# Patient Record
Sex: FEMALE | Race: WHITE | NOT HISPANIC OR LATINO | Employment: FULL TIME | URBAN - METROPOLITAN AREA
[De-identification: names, ages, dates, MRNs, and addresses within clinical notes are randomized per-mention and may not be internally consistent; named-entity substitution may affect disease eponyms.]

---

## 2021-03-24 ENCOUNTER — OFFICE VISIT (OUTPATIENT)
Dept: FAMILY MEDICINE CLINIC | Facility: CLINIC | Age: 26
End: 2021-03-24

## 2021-03-24 VITALS
TEMPERATURE: 97.8 F | RESPIRATION RATE: 16 BRPM | DIASTOLIC BLOOD PRESSURE: 54 MMHG | OXYGEN SATURATION: 99 % | HEART RATE: 92 BPM | SYSTOLIC BLOOD PRESSURE: 100 MMHG | WEIGHT: 154.8 LBS

## 2021-03-24 DIAGNOSIS — Z23 NEED FOR PROPHYLACTIC VACCINATION AGAINST HUMAN PAPILLOMAVIRUS: ICD-10-CM

## 2021-03-24 DIAGNOSIS — Z00.00 ROUTINE HEALTH MAINTENANCE: Primary | ICD-10-CM

## 2021-03-24 PROCEDURE — 99385 PREV VISIT NEW AGE 18-39: CPT | Performed by: FAMILY MEDICINE

## 2021-03-24 PROCEDURE — 90471 IMMUNIZATION ADMIN: CPT | Performed by: FAMILY MEDICINE

## 2021-03-24 PROCEDURE — 90649 4VHPV VACCINE 3 DOSE IM: CPT | Performed by: FAMILY MEDICINE

## 2021-03-24 NOTE — PROGRESS NOTES
Chief Complaint  Establish Care    Subjective    History of Present Illness {CC  Problem List  Visit  Diagnosis   Encounters  Notes  Medications  Labs  Result Review Imaging  Media :23}     Eunice Padilla presents to [facility] for Establish Care.  History of Present Illness     Here today to establish care.  Doing well overall, considers herself quite healthy.  Just has not seen a primary care physician in some time.  No specific questions or concerns at this time.    Car seat balanced diet, exercises regularly.  Enjoys her work.  Has good family and social support.  She is not sexually active, nor has she ever been.  Has never had a Pap smear.  Not sure that she is ever had the HPV vaccine, willing to do so today.  She goes to the dentist regularly.  She wears her seatbelt.    Objective     Vital Signs:   /54   Pulse 92   Temp 97.8 °F (36.6 °C)   Resp 16   Wt 70.2 kg (154 lb 12.8 oz)   SpO2 99%   Physical Exam  Vitals and nursing note reviewed.   Constitutional:       General: She is not in acute distress.     Appearance: Normal appearance. She is well-developed. She is not ill-appearing.   HENT:      Right Ear: Tympanic membrane, ear canal and external ear normal.      Left Ear: Tympanic membrane, ear canal and external ear normal.      Nose: Nose normal.      Mouth/Throat:      Mouth: Mucous membranes are moist.      Pharynx: Oropharynx is clear.   Eyes:      Extraocular Movements: Extraocular movements intact.      Conjunctiva/sclera: Conjunctivae normal.      Pupils: Pupils are equal, round, and reactive to light.   Cardiovascular:      Rate and Rhythm: Normal rate and regular rhythm.      Pulses: Normal pulses.      Heart sounds: Normal heart sounds. No murmur heard.     Pulmonary:      Effort: Pulmonary effort is normal. No respiratory distress.      Breath sounds: Normal breath sounds. No wheezing.   Abdominal:      General: Abdomen is flat. Bowel sounds  are normal. There is no distension.      Palpations: Abdomen is soft.      Tenderness: There is no abdominal tenderness. There is no guarding or rebound.   Musculoskeletal:         General: No tenderness. Normal range of motion.      Cervical back: Normal range of motion and neck supple.   Skin:     General: Skin is warm and dry.   Neurological:      General: No focal deficit present.      Mental Status: She is alert and oriented to person, place, and time.      Sensory: No sensory deficit.   Psychiatric:         Mood and Affect: Mood normal.         Behavior: Behavior normal.          Result Review  Data Reviewed:{ Labs  Result Review  Imaging  Med Tab  Media :23}                   Assessment and Plan {CC Problem List  Visit Diagnosis  ROS  Review (Popup)  Health Maintenance  Quality  BestPractice  Medications  SmartSets  SnapShot Encounters  Media :23}   Diagnoses and all orders for this visit:    1. Routine health maintenance (Primary)    2. Need for prophylactic vaccination against human papillomavirus  -     HPV Vaccine QuadriValent 3 Dose IM    HPV vaccine as above.  We will follow-up for second dose as instructed.    Recommended follow-up as below.  Encouraged communication via e[employer] in the meantime.      Follow Up {Instructions Charge Capture  Follow-up Communications :23}     Patient was given instructions and counseling regarding her condition or for health maintenance advice. Please see specific information pulled into the AVS (placed there by myself) if appropriate.    Return in about 1 year (around 3/24/2022), or if symptoms worsen or fail to improve.      LULU Alexis MD    Prevention counseling performed as below: Mindfulness for stress management.

## 2021-07-14 ENCOUNTER — TELEPHONE (OUTPATIENT)
Dept: FAMILY MEDICINE CLINIC | Facility: CLINIC | Age: 26
End: 2021-07-14

## 2021-07-14 NOTE — TELEPHONE ENCOUNTER
Caller: Eunice Padilla    Relationship to patient: Self    Best call back number: [phone]    Chief complaint: ANKLE PAIN     Type of visit: OFFICE VISIT    Requested date: THIS WEEK    If rescheduling, when is the original appointment: 7/28/2021     Additional notes:

## 2021-08-24 ENCOUNTER — OFFICE VISIT (OUTPATIENT)
Dept: FAMILY MEDICINE CLINIC | Facility: CLINIC | Age: 26
End: 2021-08-24

## 2021-08-24 VITALS
WEIGHT: 155 LBS | HEART RATE: 87 BPM | DIASTOLIC BLOOD PRESSURE: 56 MMHG | SYSTOLIC BLOOD PRESSURE: 100 MMHG | OXYGEN SATURATION: 98 % | RESPIRATION RATE: 16 BRPM | TEMPERATURE: 97.6 F

## 2021-08-24 DIAGNOSIS — F41.1 GENERALIZED ANXIETY DISORDER: Primary | ICD-10-CM

## 2021-08-24 DIAGNOSIS — Z23 NEED FOR VACCINATION: ICD-10-CM

## 2021-08-24 PROCEDURE — 99213 OFFICE O/P EST LOW 20 MIN: CPT | Performed by: FAMILY MEDICINE

## 2021-08-24 PROCEDURE — 90651 9VHPV VACCINE 2/3 DOSE IM: CPT | Performed by: FAMILY MEDICINE

## 2021-08-24 PROCEDURE — 90471 IMMUNIZATION ADMIN: CPT | Performed by: FAMILY MEDICINE

## 2021-08-24 NOTE — PROGRESS NOTES
Chief Complaint  Anxiety    Subjective    History of Present Illness {CC  Problem List  Visit  Diagnosis   Encounters  Notes  Medications  Labs  Result Review Imaging  Media :23}     Eunice Padilla presents to [facility] for Anxiety.  History of Present Illness     Pt presents today with anxiety that has been increasing in severity for the last week.  She has a family history of anxiety and had taking 50mg sertraline during high school and college and felt that this adequately managed her symptoms. 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     She has recently started preparing to apply to medical school and due to the increased anxiety, she wants to restart her Zoloft.     Due for second HPV vaccine today.     Objective     Vital Signs:   /56   Pulse 87   Temp 97.6 °F (36.4 °C)   Resp 16   Wt 70.3 kg (155 lb)   SpO2 98%   Physical Exam  Vitals and nursing note reviewed.   Constitutional:       General: She is not in acute distress.     Appearance: Normal appearance. She is not ill-appearing.   Cardiovascular:      Rate and Rhythm: Normal rate and regular rhythm.      Pulses: Normal pulses.      Heart sounds: Normal heart sounds. No murmur heard.     Pulmonary:      Effort: Pulmonary effort is normal. No respiratory distress.      Breath sounds: Normal breath sounds. No rales.   Neurological:      Mental Status: She is alert and oriented to person, place, and time. Mental status is at baseline.   Psychiatric:         Mood and Affect: Mood normal.         Behavior: Behavior normal.          Result Review  Data Reviewed:{ Labs  Result Review  Imaging  Med Tab  Media :23}                   Assessment and Plan {CC Problem List  Visit Diagnosis  ROS  Review (Popup)  Health Maintenance  Quality  BestPractice  Medications  SmartSets  SnapShot Encounters  Media :23}   Diagnoses and  all orders for this visit:    1. Generalized anxiety disorder (Primary)  -     sertraline (ZOLOFT) 50 MG tablet; Take 1 tablet by mouth Daily for 180 days.  Dispense: 90 tablet; Refill: 1    2. Need for vaccination  -     HPV Vaccine (HPV9)    Long discussion about management of generalized anxiety over the long-term. Encouraged her to start the sertraline back as above. Given instructions for self titration.    Vaccine is requested.    Recommended follow-up as below. Encouraged communication via In[employer] in the meantime.      Follow Up {Instructions Charge Capture  Follow-up Communications :23}     Patient was given instructions and counseling regarding her condition or for health maintenance advice. Please see specific information pulled into the AVS (placed there by myself) if appropriate.    Return in about 3 months (around 11/24/2021).      LULU Alexis MD

## 2021-08-24 NOTE — PROGRESS NOTES
Chief Complaint  Anxiety    Subjective    History of Present Illness {CC  Problem List  Visit  Diagnosis   Encounters  Notes  Medications  Labs  Result Review Imaging  Media :23}     Eunice Padilla presents to [facility] for Anxiety.  History of Present Illness         Objective     Vital Signs:   /56   Pulse 87   Temp 97.6 °F (36.4 °C)   Resp 16   Wt 70.3 kg (155 lb)   SpO2 98%   Physical Exam     Result Review  Data Reviewed:{ Labs  Result Review  Imaging  Med Tab  Media :23}   {The following data was reviewed by (Optional):60410}  {AMBULATORY LABS (Optional):53903}  {Data reviewed (Optional):89618}            Assessment and Plan {CC Problem List  Visit Diagnosis  ROS  Review (Popup)  Health Maintenance  Quality  BestPractice  Medications  SmartSets  SnapShot Encounters  Media :23}   There are no diagnoses linked to this encounter.    {Time Spent (Optional):70663}  Follow Up {Instructions Charge Capture  Follow-up Communications :23}     Patient was given instructions and counseling regarding her condition or for health maintenance advice. Please see specific information pulled into the AVS (placed there by myself) if appropriate.    No follow-ups on file.      LULU Alexis MD

## 2021-08-24 NOTE — PATIENT INSTRUCTIONS
Mindfulness apps: Headspace, Smiling Mind, Liberate, Calm    Mindfulness-Based Stress Reduction  Mindfulness-based stress reduction (MBSR) is a program that helps people learn to practice mindfulness. Mindfulness is the practice of intentionally paying attention to the present moment. It can be learned and practiced through techniques such as education, breathing exercises, meditation, and yoga. MBSR includes several mindfulness techniques in one program.  MBSR works best when you understand the treatment, are willing to try new things, and can commit to spending time practicing what you learn. MBSR training may include learning about:  · How your emotions, thoughts, and reactions affect your body.  · New ways to respond to things that cause negative thoughts to start (triggers).  · How to notice your thoughts and let go of them.  · Practicing awareness of everyday things that you normally do without thinking.  · The techniques and goals of different types of meditation.  What are the benefits of MBSR?  MBSR can have many benefits, which include helping you to:  · Develop self-awareness. This refers to knowing and understanding yourself.  · Learn skills and attitudes that help you to participate in your own health care.  · Learn new ways to care for yourself.  · Be more accepting about how things are, and let things go.  · Be less judgmental and approach things with an open mind.  · Be patient with yourself and trust yourself more.  MBSR has also been shown to:  · Reduce negative emotions, such as depression and anxiety.  · Improve memory and focus.  · Change how you sense and approach pain.  · Boost your body's ability to fight infections.  · Help you connect better with other people.  · Improve your sense of well-being.  Follow these instructions at home:    · Find a local in-person or online MBSR program.  · Set aside some time regularly for mindfulness practice.  · Find a mindfulness practice that works best for  you. This may include one or more of the following:  ? Meditation. Meditation involves focusing your mind on a certain thought or activity.  ? Breathing awareness exercises. These help you to stay present by focusing on your breath.  ? Body scan. For this practice, you lie down and pay attention to each part of your body from head to toe. You can identify tension and soreness and intentionally relax parts of your body.  ? Yoga. Yoga involves stretching and breathing, and it can improve your ability to move and be flexible. It can also provide an experience of testing your body's limits, which can help you release stress.  ? Mindful eating. This way of eating involves focusing on the taste, texture, color, and smell of each bite of food. Because this slows down eating and helps you feel full sooner, it can be an important part of a weight-loss plan.  · Find a podcast or recording that provides guidance for breathing awareness, body scan, or meditation exercises. You can listen to these any time when you have a free moment to rest without distractions.  · Follow your treatment plan as told by your health care provider. This may include taking regular medicines and making changes to your diet or lifestyle as recommended.  How to practice mindfulness  To do a basic awareness exercise:  · Find a comfortable place to sit.  · Pay attention to the present moment. Observe your thoughts, feelings, and surroundings just as they are.  · Avoid placing judgment on yourself, your feelings, or your surroundings. Make note of any judgment that comes up, and let it go.  · Your mind may wander, and that is okay. Make note of when your thoughts drift, and return your attention to the present moment.  To do basic mindfulness meditation:  · Find a comfortable place to sit. This may include a stable chair or a firm floor cushion.  ? Sit upright with your back straight. Let your arms fall next to your side with your hands resting on your  legs.  ? If sitting in a chair, rest your feet flat on the floor.  ? If sitting on a cushion, cross your legs in front of you.  · Keep your head in a neutral position with your chin dropped slightly. Relax your jaw and rest the tip of your tongue on the roof of your mouth. Drop your gaze to the floor. You can close your eyes if you like.  · Breathe normally and pay attention to your breath. Feel the air moving in and out of your nose. Feel your belly expanding and relaxing with each breath.  · Your mind may wander, and that is okay. Make note of when your thoughts drift, and return your attention to your breath.  · Avoid placing judgment on yourself, your feelings, or your surroundings. Make note of any judgment or feelings that come up, let them go, and bring your attention back to your breath.  · When you are ready, lift your gaze or open your eyes. Pay attention to how your body feels after the meditation.  Where to find more information  You can find more information about MBSR from:  · Your health care provider.  · Community-based meditation centers or programs.  · Programs offered near you.  Summary  · Mindfulness-based stress reduction (MBSR) is a program that teaches you how to intentionally pay attention to the present moment. It is used with other treatments to help you cope better with daily stress, emotions, and pain.  · MBSR focuses on developing self-awareness, which allows you to respond to life stress without judgment or negative emotions.  · MBSR programs may involve learning different mindfulness practices, such as breathing exercises, meditation, yoga, body scan, or mindful eating. Find a mindfulness practice that works best for you, and set aside time for it on a regular basis.  This information is not intended to replace advice given to you by your health care provider. Make sure you discuss any questions you have with your health care provider.  Document Released: 04/26/2018 Document Revised:  11/30/2018 Document Reviewed: 04/26/2018  Elsevier Patient Education © 2020 [employer]

## 2021-08-24 NOTE — PROGRESS NOTES
Chief Complaint  Anxiety    Subjective    History of Present Illness {CC  Problem List  Visit  Diagnosis   Encounters  Notes  Medications  Labs  Result Review Imaging  Media :23}     Eunice Padilla presents to [facility] for Anxiety.  History of Present Illness         Objective     Vital Signs:   /56   Pulse 87   Temp 97.6 °F (36.4 °C)   Resp 16   Wt 70.3 kg (155 lb)   SpO2 98%   Physical Exam     Result Review  Data Reviewed:{ Labs  Result Review  Imaging  Med Tab  Media :23}   {The following data was reviewed by (Optional):48479}  {AMBULATORY LABS (Optional):93631}  {Data reviewed (Optional):01589}            Assessment and Plan {CC Problem List  Visit Diagnosis  ROS  Review (Popup)  Health Maintenance  Quality  BestPractice  Medications  SmartSets  SnapShot Encounters  Media :23}   Diagnoses and all orders for this visit:    1. Generalized anxiety disorder (Primary)  -     sertraline (ZOLOFT) 50 MG tablet; Take 1 tablet by mouth Daily for 180 days.  Dispense: 90 tablet; Refill: 1    2. Need for vaccination  -     HPV Vaccine (HPV9)        {Time Spent (Optional):63434}  Follow Up {Instructions Charge Capture  Follow-up Communications :23}     Patient was given instructions and counseling regarding her condition or for health maintenance advice. Please see specific information pulled into the AVS (placed there by myself) if appropriate.    No follow-ups on file.      LULU Alexis MD

## 2022-03-25 DIAGNOSIS — F41.1 GENERALIZED ANXIETY DISORDER: ICD-10-CM

## 2022-04-26 ENCOUNTER — APPOINTMENT (OUTPATIENT)
Dept: GENERAL RADIOLOGY | Facility: HOSPITAL | Age: 27
End: 2022-04-26

## 2022-04-26 ENCOUNTER — HOSPITAL ENCOUNTER (EMERGENCY)
Facility: HOSPITAL | Age: 27
Discharge: HOME | End: 2022-04-26
Attending: EMERGENCY MEDICINE | Admitting: EMERGENCY MEDICINE

## 2022-04-26 VITALS
OXYGEN SATURATION: 98 % | TEMPERATURE: 97.4 F | WEIGHT: 160 LBS | SYSTOLIC BLOOD PRESSURE: 106 MMHG | DIASTOLIC BLOOD PRESSURE: 72 MMHG | RESPIRATION RATE: 16 BRPM | BODY MASS INDEX: 23.7 KG/M2 | HEIGHT: 69 IN | HEART RATE: 91 BPM

## 2022-04-26 DIAGNOSIS — S92.352A CLOSED DISPLACED FRACTURE OF FIFTH METATARSAL BONE OF LEFT FOOT, INITIAL ENCOUNTER: Primary | ICD-10-CM

## 2022-04-26 PROCEDURE — 73630 X-RAY EXAM OF FOOT: CPT

## 2022-04-26 PROCEDURE — 99283 EMERGENCY DEPT VISIT LOW MDM: CPT

## 2022-04-26 RX ORDER — IBUPROFEN 600 MG/1
600 TABLET ORAL EVERY 6 HOURS PRN
Qty: 24 TABLET | Refills: 0 | Status: SHIPPED | OUTPATIENT
Start: 2022-04-26

## 2022-04-26 RX ORDER — IBUPROFEN 800 MG/1
800 TABLET ORAL ONCE
Status: COMPLETED | OUTPATIENT
Start: 2022-04-26 | End: 2022-04-26

## 2022-04-26 RX ADMIN — IBUPROFEN 800 MG: 800 TABLET, FILM COATED ORAL at 22:01

## 2022-04-27 NOTE — ED PROVIDER NOTES
Pt presents to the ED c/o  left foot and ankle pain after she rolled her ankle during ballet earlier in the evening.  Did feel a pop.  Difficulty bearing weight due to pain.  No other significant pain in the lower leg.     On exam,   General: No acute distress, nontoxic  HEENT: EOMI  Pulm: Symmetric chest rise, nonlabored breathing  CV: Regular rate and rhythm  GI: Nondistended  MSK: Edema and tenderness in the lateral dorsal and proximal midfoot with tenderness worse at the base of the fifth digit, no malleoli or tenderness, no navicular tenderness  Skin: Warm, dry  Neuro: Awake, alert, oriented x 4, neurovascularly intact distally in the left lower extremity, moving all extremities, no focal deficits  Psych: Calm, cooperative    Vital signs and nursing notes reviewed.         N95, protective eye goggles, and gloves used during this encounter. Patient in surgical mask.      Plan:   ED Course as of 04/27/22 1516   Tue Apr 26, 2022 2136 Tray shows a fracture at the base of the fifth metatarsal.  We will treat with crutches, Ace wrap, cam walker boot, and close orthopedic follow-up [RC]      ED Course User Index  [RC] Leonel Call III, PA     Fracture noted on x-ray, placed in a cam boot, crutches, recommended supportive care measures including rest, ice, compression, elevation, orthopedic follow-up, and nonweightbearing until cleared by orthopedics.  ED return for worsening symptoms as needed.     Attestation:  The ZARINA and I have discussed this patient's history, physical exam, and treatment plan.  I have reviewed the documentation and personally had a face to face interaction with the patient. I affirm the documentation and agree with the treatment and plan.  The attached note describes my personal findings.            Rey Quevedo MD  04/27/22 3137

## 2022-04-27 NOTE — ED NOTES
Notes while at [employer] today she landed on left foot wrong and felt a pop.  Mild swelling noted to left lateral ankle.  Pulses are strong and equal bilaterally.  Notes intermittent tingling to left foot.  Capillary refill less than 3 seconds.  VSS.  ABC's intact.  NAD. Ice applied. Patient wearing mask, nurse wearing mask and protective eyewear during care and assessment.  Hand hygiene performed prior to and post care.

## 2022-04-27 NOTE — DISCHARGE INSTRUCTIONS
Ice and elevate as per the handout.  Wear the Ace wrap and orthopedic boot.  Recommend using the provided crutches and remain nonweightbearing as best as possible until you see the orthopedist.    Return to the emergency department with any further concerns

## 2022-04-27 NOTE — ED NOTES
"Pt to ER by KARELY c/o left foot injury from dancing occurred about 1930. Pt reports rolling her ankle and \"hearing a pop\", now having a large bruise to lateral foot.    This RN is wearing mask, gloves and goggles at all times during all patient interactions.  "

## 2022-04-27 NOTE — ED PROVIDER NOTES
EMERGENCY DEPARTMENT ENCOUNTER    Room Number:  B01/01  Date of encounter:  4/28/2022  PCP: Peyman Alexis MD  Historian: Patient      HPI:  Chief Complaint: Left ankle and foot injury  A complete HPI/ROS/PMH/PSH/SH/FH are unobtainable due to: Nothing    Context: Eunice Padilla is a 26 y.o. female who presents to the ED c/o left ankle and foot injury that occurred approximately 2 hours PTA.  Patient states she was performing exercise landed funny rolled her left ankle and felt a pop.  She states since that time the foot and ankle began to swell and she is having trouble bearing weight.  Pain is said to be moderate and worse with weightbearing or movement.  Resting the foot and ankle makes it slightly better.  Pain does not radiate and is localized to the site of injury.  She denies sustaining any other injury in the process of falling.      PAST MEDICAL HISTORY  Active Ambulatory Problems     Diagnosis Date Noted   • Generalized anxiety disorder 08/24/2021     Resolved Ambulatory Problems     Diagnosis Date Noted   • No Resolved Ambulatory Problems     Past Medical History:   Diagnosis Date   • Anxiety          PAST SURGICAL HISTORY  No past surgical history on file.      FAMILY HISTORY  Family History   Problem Relation Age of Onset   • Other Mother 55        brain tumor - benign   • Hypertension Mother    • Anxiety disorder Mother    • Anxiety disorder Sister    • Anxiety disorder Sister    • Breast cancer Maternal Grandmother    • Colon cancer Neg Hx          SOCIAL HISTORY  Social History     Socioeconomic History   • Marital status: Single   Tobacco Use   • Smoking status: Never Smoker   • Smokeless tobacco: Never Used   Substance and Sexual Activity   • Alcohol use: Never   • Sexual activity: Never     Partners: Male         ALLERGIES  Patient has no known allergies.        REVIEW OF SYSTEMS  Review of Systems   Constitutional: Negative for chills and fever.   HENT: Negative.    Eyes: Negative.     Respiratory: Negative for cough and shortness of breath.    Cardiovascular: Negative for chest pain and palpitations.   Gastrointestinal: Negative for abdominal pain.   Genitourinary: Negative.    Musculoskeletal: Negative.    Skin: Negative.    Neurological: Negative.    Hematological: Negative.    Psychiatric/Behavioral: Negative.         All systems reviewed and negative except for those discussed in HPI.       PHYSICAL EXAM    I have reviewed the triage vital signs and nursing notes.    ED Triage Vitals [04/26/22 2007]   Temp Heart Rate Resp BP SpO2   97.4 °F (36.3 °C) 91 16 -- 98 %      Temp src Heart Rate Source Patient Position BP Location FiO2 (%)   -- -- -- -- --       Physical Exam  GENERAL: WDWN female, no acute distress  HENT: nares patent  EYES: no scleral icterus  CV: regular rhythm, regular rate  RESPIRATORY: normal effort  ABDOMEN: soft  MUSCULOSKELETAL: Soft tissue swelling over the lateral left foot.   TTP at the base of the fifth metatarsal.  Medial lateral malleolus nontender, ankle exam is normal as is the proximal tib-fib.  Compartments of the left lower extremity remain soft  NEURO: alert, moves all extremities, follows commands  SKIN: warm, dry        LAB RESULTS  No results found for this or any previous visit (from the past 24 hour(s)).    Ordered the above labs and independently reviewed the results.        RADIOLOGY  No Radiology Exams Resulted Within Past 24 Hours    I ordered the above noted radiological studies. Reviewed by me and discussed with radiologist.  See dictation for official radiology interpretation.      PROCEDURES    Procedures      MEDICATIONS GIVEN IN ER    Medications   ibuprofen (ADVIL,MOTRIN) tablet 800 mg (800 mg Oral Given 4/26/22 2201)         PROGRESS, DATA ANALYSIS, CONSULTS, AND MEDICAL DECISION MAKING    All labs have been independently reviewed by me.  All radiology studies have been reviewed by me and discussed with radiologist dictating the report.   EKG's  independently viewed and interpreted by me.  Discussion below represents my analysis of pertinent findings related to patient's condition, differential diagnosis, treatment plan and final disposition.    DDx includes but is not limited to: Left foot sprain, left fifth metatarsal fracture, left ankle sprain.  The ankle appears nontender on exam.  Will obtain x-rays of the left foot to rule out the above.    ED Course as of 04/28/22 0628 Tue Apr 26, 2022   2136 Tray shows a fracture at the base of the fifth metatarsal.  We will treat with crutches, Ace wrap, cam walker boot, and close orthopedic follow-up [RC]      ED Course User Index  [RC] Leonel Call III, PA           PPE: The patient wore a surgical mask throughout the entire patient encounter. I wore an N95.    AS OF 06:28 EDT VITALS:    BP - 106/72  HR - 91  TEMP - 97.4 °F (36.3 °C)  O2 SATS - 98%        DIAGNOSIS  Final diagnoses:   Closed displaced fracture of fifth metatarsal bone of left foot, initial encounter         DISPOSITION  DISCHARGE    Patient discharged in stable condition.    Reviewed implications of results, diagnosis, meds, responsibility to follow up, warning signs and symptoms of possible worsening, potential complications and reasons to return to ER.    Patient/Family voiced understanding of above instructions.    Discussed plan for discharge, as there is no emergent indication for admission. Patient referred to primary care provider for BP management due to today's BP. Pt/family is agreeable and understands need for follow up and repeat testing.  Pt is aware that discharge does not mean that nothing is wrong but it indicates no emergency is present that requires admission and they must continue care with follow-up as given below or physician of their choice.     FOLLOW-UP  Suzie Pat MD  [address] [facility] [address]  [place] [id number] [phone]    Schedule an appointment as soon as possible for a visit   For  further evaluation and treatment         Medication List      Changed    * ibuprofen 800 MG tablet  Commonly known as: ADVIL,MOTRIN  Take 1 tablet by mouth Every 8 (Eight) Hours As Needed for Mild Pain .  What changed: Another medication with the same name was added. Make sure you understand how and when to take each.     * ibuprofen 600 MG tablet  Commonly known as: ADVIL,MOTRIN  Take 1 tablet by mouth Every 6 (Six) Hours As Needed for Moderate Pain .  What changed: You were already taking a medication with the same name, and this prescription was added. Make sure you understand how and when to take each.         * This list has 2 medication(s) that are the same as other medications prescribed for you. Read the directions carefully, and ask your doctor or other care provider to review them with you.               Where to Get Your Medications      These medications were sent to [facility] [address] [place] [address] [address] [facility] [address]  - [phone]  - [phone] [address] [facility] [address]    Phone: [phone]   · ibuprofen 600 MG tablet                Leonel Call III, PA  04/28/22 4469

## 2022-11-01 DIAGNOSIS — F41.1 GENERALIZED ANXIETY DISORDER: ICD-10-CM

## 2022-11-01 NOTE — TELEPHONE ENCOUNTER
Caller: Eunice Padilla    Relationship: Self    Best call back number: [phone]    Requested Prescriptions:   Requested Prescriptions     Pending Prescriptions Disp Refills   • sertraline (ZOLOFT) 50 MG tablet 90 tablet 1     Sig: Take 1 tablet by mouth Daily.        Pharmacy where request should be sent: [facility] [id number] [place] [id number] [address] AT [facility] [address] [facility] - [phone]  - [phone] FX     Additional details provided by patient: PATIENT STATES THAT SHE IS OUT OF THE MEDICATION AT THIS TIME. PLEASE CALL IF PATIENT NEEDS AN APPOINTMENT PRIOR TO REFILLING MEDICATION    Does the patient have less than a 3 day supply:  [x] Yes  [] No    Chintan Del Rosario Rep   11/01/22 14:27 EDT

## 2022-12-01 DIAGNOSIS — F41.1 GENERALIZED ANXIETY DISORDER: ICD-10-CM

## 2022-12-01 NOTE — TELEPHONE ENCOUNTER
Caller: Eunice Padilla    Relationship: Self    Best call back number: [phone]     Requested Prescriptions:   Requested Prescriptions     Pending Prescriptions Disp Refills   • sertraline (ZOLOFT) 50 MG tablet 30 tablet 0     Sig: Take 1 tablet by mouth Daily. Appt needed for refill.        Pharmacy where request should be sent: [facility] [id number] [place] [address] AT [facility] [address] & [facility] [phone] [facility] [phone] FX     Additional details provided by patient: APPOINTMENT SCHEDULED FOR 12/08/22    Does the patient have less than a 3 day supply:  [x] Yes  [] No    Would you like a call back once the refill request has been completed: [x] Yes [] No    If the office needs to give you a call back, can they leave a voicemail: [] Yes [x] No    Chintan Villatoro Rep   12/01/22 14:27 EST

## 2022-12-08 ENCOUNTER — OFFICE VISIT (OUTPATIENT)
Dept: FAMILY MEDICINE CLINIC | Facility: CLINIC | Age: 27
End: 2022-12-08

## 2022-12-08 VITALS
HEIGHT: 70 IN | WEIGHT: 140 LBS | RESPIRATION RATE: 17 BRPM | HEART RATE: 64 BPM | OXYGEN SATURATION: 100 % | DIASTOLIC BLOOD PRESSURE: 68 MMHG | BODY MASS INDEX: 20.04 KG/M2 | SYSTOLIC BLOOD PRESSURE: 116 MMHG

## 2022-12-08 DIAGNOSIS — F41.1 GENERALIZED ANXIETY DISORDER: ICD-10-CM

## 2022-12-08 DIAGNOSIS — R05.2 SUBACUTE COUGH: Primary | ICD-10-CM

## 2022-12-08 PROCEDURE — 99213 OFFICE O/P EST LOW 20 MIN: CPT | Performed by: FAMILY MEDICINE

## 2022-12-08 NOTE — PROGRESS NOTES
"Chief Complaint  Anxiety, Cough, and URI (X2 weeks )    Subjective    History of Present Illness {CC  Problem List  Visit  Diagnosis   Encounters  Notes  Medications  Labs  Result Review Imaging  Media :23}     Eunice Padilla presents to [facility] for Anxiety, Cough, and URI (X2 weeks ).  History of Present Illness     Here today for follow-up as above.    Doing fairly well overall. Needs a refill of her sertraline. Uses it for management of generalized anxiety disorder with good symptom management. Reports good adherence and tolerance.    Has had a cough now for several weeks. Started with URI symptoms most of which have now resolved. Only the cough remains.. Cough is occasionally productive but mostly dry and nagging. Does feel that it is slowly getting better. 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 Has had her flu shot, has not had her most recent COVID booster. Not interested today. No further fevers or chills.    Objective     Vital Signs:   /68   Pulse 64   Resp 17   Ht 177.8 cm (70\")   Wt 63.5 kg (140 lb)   SpO2 100%   BMI 20.09 kg/m²   Physical Exam  Vitals and nursing note reviewed.   Constitutional:       General: She is not in acute distress.     Appearance: Normal appearance. She is not ill-appearing.   Cardiovascular:      Rate and Rhythm: Normal rate and regular rhythm.      Pulses: Normal pulses.      Heart sounds: Normal heart sounds. No murmur heard.  Pulmonary:      Effort: Pulmonary effort is normal. No respiratory distress.      Breath sounds: Normal breath sounds. No rales.   Neurological:      Mental Status: She is alert and oriented to person, place, and time. Mental status is at baseline.   Psychiatric:         Mood and Affect: Mood normal.         Behavior: Behavior normal.          Result Review  Data Reviewed:{ Labs  Result Review  Imaging  Med Tab  Media :23}        "            Assessment and Plan {CC Problem List  Visit Diagnosis  ROS  Review (Popup)  Health Maintenance  Quality  BestPractice  Medications  SmartSets  SnapShot Encounters  Media :23}   Diagnoses and all orders for this visit:    1. Subacute cough (Primary)    2. Generalized anxiety disorder  -     sertraline (ZOLOFT) 50 MG tablet; Take 1 tablet by mouth Daily. Appt needed for refill.  Dispense: 90 tablet; Refill: 3    Point-of-care flu and COVID testing is negative. Discussed symptomatic cough management.    Refilled sertraline as discussed.    Recommended follow-up as below. Encouraged communication via Re[employer] in the meantime.    Patient was given instructions and counseling regarding her condition or for health maintenance advice. Please see specific information pulled into the AVS (placed there by myself) if appropriate.    Return in about 4 months (around 4/8/2023), or if symptoms worsen or fail to improve, for Preventive Health Maintenance.      LULU Alexis MD

## 2023-12-14 ENCOUNTER — OFFICE VISIT (OUTPATIENT)
Dept: FAMILY MEDICINE CLINIC | Facility: CLINIC | Age: 28
End: 2023-12-14
Payer: COMMERCIAL

## 2023-12-14 VITALS
BODY MASS INDEX: 20.62 KG/M2 | OXYGEN SATURATION: 99 % | DIASTOLIC BLOOD PRESSURE: 60 MMHG | WEIGHT: 144 LBS | HEIGHT: 70 IN | HEART RATE: 84 BPM | RESPIRATION RATE: 16 BRPM | SYSTOLIC BLOOD PRESSURE: 116 MMHG

## 2023-12-14 DIAGNOSIS — R53.83 FATIGUE, UNSPECIFIED TYPE: ICD-10-CM

## 2023-12-14 DIAGNOSIS — J02.9 SORE THROAT: Primary | ICD-10-CM

## 2023-12-14 LAB
EXPIRATION DATE: NORMAL
EXPIRATION DATE: NORMAL
FLUAV AG UPPER RESP QL IA.RAPID: NOT DETECTED
FLUBV AG UPPER RESP QL IA.RAPID: NOT DETECTED
INTERNAL CONTROL: NORMAL
INTERNAL CONTROL: NORMAL
Lab: NORMAL
Lab: NORMAL
S PYO AG THROAT QL: NEGATIVE
SARS-COV-2 AG UPPER RESP QL IA.RAPID: NOT DETECTED

## 2023-12-14 PROCEDURE — 87428 SARSCOV & INF VIR A&B AG IA: CPT | Performed by: NURSE PRACTITIONER

## 2023-12-14 PROCEDURE — 99213 OFFICE O/P EST LOW 20 MIN: CPT | Performed by: NURSE PRACTITIONER

## 2023-12-14 PROCEDURE — 87880 STREP A ASSAY W/OPTIC: CPT | Performed by: NURSE PRACTITIONER

## 2023-12-14 NOTE — PROGRESS NOTES
Subjective   Eunice Padilla is a 28 y.o. female.     History of Present Illness   Patient presents with c/o sore throat, fatigue and diarrhea that started last night. She is concerned that she has strep because she was exposed by her roommate. She reports nasal congestion. She denies any fever. She reports that she took tylenol last night for headache.     The following portions of the patient's history were reviewed and updated as appropriate: allergies, current medications, past family history, past medical history, past social history, past surgical history and problem list.    Review of Systems   Constitutional:  Positive for fatigue. Negative for appetite change, chills and fever.   HENT:  Positive for congestion and sore throat. Negative for ear pain, postnasal drip, rhinorrhea, sinus pressure and sneezing.    Eyes:  Negative for redness and itching.   Respiratory:  Negative for cough, chest tightness and shortness of breath.    Cardiovascular:  Negative for chest pain, palpitations and leg swelling.   Musculoskeletal:  Negative for myalgias.   Allergic/Immunologic: Negative.    Neurological:  Positive for headache. Negative for dizziness.       Objective   Physical Exam  Vitals and nursing note reviewed.   Constitutional:       Appearance: She is well-developed.   HENT:      Head: Normocephalic and atraumatic.      Right Ear: Tympanic membrane, ear canal and external ear normal.      Left Ear: Tympanic membrane, ear canal and external ear normal.      Nose: Nose normal.      Right Sinus: No maxillary sinus tenderness or frontal sinus tenderness.      Left Sinus: No maxillary sinus tenderness or frontal sinus tenderness.      Mouth/Throat:      Lips: Pink.      Mouth: Mucous membranes are moist.      Pharynx: Oropharynx is clear. No oropharyngeal exudate.      Comments: Postnasal drainage noted with inspection.   Eyes:      General:         Right eye: No discharge.         Left eye: No discharge.       Conjunctiva/sclera: Conjunctivae normal.      Pupils: Pupils are equal, round, and reactive to light.   Neck:      Thyroid: No thyromegaly.   Cardiovascular:      Rate and Rhythm: Normal rate and regular rhythm.      Heart sounds: Normal heart sounds. No murmur heard.  Pulmonary:      Effort: Pulmonary effort is normal. No tachypnea or respiratory distress.      Breath sounds: Normal breath sounds. No decreased breath sounds, wheezing or rales.   Musculoskeletal:      Cervical back: Normal range of motion and neck supple.   Lymphadenopathy:      Cervical: No cervical adenopathy.   Skin:     General: Skin is warm and dry.   Neurological:      Mental Status: She is alert and oriented to person, place, and time.   Psychiatric:         Behavior: Behavior normal.         Thought Content: Thought content normal.         Judgment: Judgment normal.         Vitals:    12/14/23 1248   BP: 116/60   Pulse: 84   Resp: 16   SpO2: 99%     Body mass index is 20.66 kg/m².      Procedures    Assessment & Plan   Problems Addressed this Visit    None  Visit Diagnoses       Sore throat    -  Primary    Relevant Orders    POC Rapid Strep A    Fatigue, unspecified type        Relevant Orders    POCT SARS-CoV-2 + Flu Antigen KELLI          Diagnoses         Codes Comments    Sore throat    -  Primary ICD-10-CM: J02.9  ICD-9-CM: 462     Fatigue, unspecified type     ICD-10-CM: R53.83  ICD-9-CM: 780.79           POCT sars + flu   POCT strep A - negative  May take over the counter dayquil as prescribed.          Return if symptoms worsen or fail to improve.

## 2024-07-08 DIAGNOSIS — F41.1 GENERALIZED ANXIETY DISORDER: ICD-10-CM

## 2024-07-08 NOTE — TELEPHONE ENCOUNTER
LAST REFILL - 06/29/23  LAST VISIT - 06/29/23  NEXT VISIT - not scheduled    30 day supply pended, added to sig appt needed for further refills.

## 2024-07-15 ENCOUNTER — OFFICE VISIT (OUTPATIENT)
Dept: FAMILY MEDICINE CLINIC | Facility: CLINIC | Age: 29
End: 2024-07-15
Payer: COMMERCIAL

## 2024-07-15 VITALS
HEART RATE: 75 BPM | HEIGHT: 70 IN | SYSTOLIC BLOOD PRESSURE: 112 MMHG | BODY MASS INDEX: 21.76 KG/M2 | OXYGEN SATURATION: 98 % | DIASTOLIC BLOOD PRESSURE: 80 MMHG | WEIGHT: 152 LBS | RESPIRATION RATE: 18 BRPM

## 2024-07-15 DIAGNOSIS — F41.1 GENERALIZED ANXIETY DISORDER: Primary | ICD-10-CM

## 2024-07-15 PROCEDURE — 99213 OFFICE O/P EST LOW 20 MIN: CPT | Performed by: NURSE PRACTITIONER

## 2024-07-15 NOTE — PROGRESS NOTES
Subjective   Eunice Padilla is a 28 y.o. female.     Chief Complaint   Patient presents with    Depression        History of Present Illness   Patient presents for follow-up for CATHY. She is taking sertraline daily and reports that her anxiety is controlled with medication. Patient is taking medications and denies any adverse side affects. She has taken this medication since the age of 16. She is not currently in therapy.     The following portions of the patient's history were reviewed and updated as appropriate: allergies, current medications, past family history, past medical history, past social history, past surgical history and problem list.    Review of Systems   Constitutional:  Negative for chills, fatigue and fever.   Respiratory:  Positive for chest tightness (under ribs with anxiety). Negative for cough, shortness of breath and wheezing.    Cardiovascular:  Negative for chest pain, palpitations and leg swelling.   Neurological:  Negative for dizziness and headache.   Hematological: Negative.    Psychiatric/Behavioral:  Negative for sleep disturbance and depressed mood. The patient is nervous/anxious.        Objective   Physical Exam  Vitals and nursing note reviewed.   Constitutional:       Appearance: Normal appearance. She is well-developed.   HENT:      Head: Normocephalic and atraumatic.   Eyes:      Conjunctiva/sclera: Conjunctivae normal.      Pupils: Pupils are equal, round, and reactive to light.   Cardiovascular:      Rate and Rhythm: Normal rate and regular rhythm.      Heart sounds: Normal heart sounds. No murmur heard.  Pulmonary:      Effort: Pulmonary effort is normal.      Breath sounds: Normal breath sounds.   Neurological:      Mental Status: She is alert and oriented to person, place, and time.   Psychiatric:         Attention and Perception: Attention and perception normal.         Mood and Affect: Affect normal. Mood is anxious.         Speech: Speech normal.         Behavior:  Behavior normal. Behavior is cooperative.         Thought Content: Thought content normal.         Cognition and Memory: Cognition and memory normal.         Judgment: Judgment normal.         Vitals:    07/15/24 1530   BP: 112/80   Pulse: 75   Resp: 18   SpO2: 98%     Body mass index is 21.81 kg/m².      Procedures    Assessment & Plan   Problems Addressed this Visit          Mental Health    Generalized anxiety disorder - Primary    Relevant Medications    sertraline (ZOLOFT) 50 MG tablet     Diagnoses         Codes Comments    Generalized anxiety disorder    -  Primary ICD-10-CM: F41.1  ICD-9-CM: 300.02           Continue sertraline 50 mg 1 p.o. QD         Return in about 1 month (around 8/15/2024) for Annual, Labs.

## 2024-12-24 DIAGNOSIS — F41.1 GENERALIZED ANXIETY DISORDER: ICD-10-CM

## 2025-01-21 DIAGNOSIS — F41.1 GENERALIZED ANXIETY DISORDER: ICD-10-CM

## 2025-03-01 DIAGNOSIS — F41.1 GENERALIZED ANXIETY DISORDER: ICD-10-CM

## 2025-04-24 DIAGNOSIS — F41.1 GENERALIZED ANXIETY DISORDER: ICD-10-CM

## 2025-06-22 DIAGNOSIS — F41.1 GENERALIZED ANXIETY DISORDER: ICD-10-CM
